# Patient Record
Sex: MALE | Race: WHITE | Employment: OTHER | URBAN - METROPOLITAN AREA
[De-identification: names, ages, dates, MRNs, and addresses within clinical notes are randomized per-mention and may not be internally consistent; named-entity substitution may affect disease eponyms.]

---

## 2017-04-06 DIAGNOSIS — I10 HYPERTENSION, BENIGN: ICD-10-CM

## 2017-04-06 DIAGNOSIS — E78.2 MIXED HYPERLIPIDEMIA: ICD-10-CM

## 2017-04-06 RX ORDER — FOSINOPRIL SODIUM 40 MG/1
40 TABLET ORAL DAILY
Qty: 90 TAB | Refills: 0 | Status: SHIPPED | OUTPATIENT
Start: 2017-04-06 | End: 2017-07-07 | Stop reason: SDUPTHER

## 2017-04-06 RX ORDER — VERAPAMIL HYDROCHLORIDE 240 MG/1
240 TABLET, FILM COATED, EXTENDED RELEASE ORAL DAILY
Qty: 90 TAB | Refills: 0 | Status: SHIPPED | OUTPATIENT
Start: 2017-04-06 | End: 2017-07-07 | Stop reason: SDUPTHER

## 2017-04-06 RX ORDER — CLONIDINE HYDROCHLORIDE 0.1 MG/1
0.1 TABLET ORAL 2 TIMES DAILY
Qty: 180 TAB | Refills: 0 | Status: SHIPPED | OUTPATIENT
Start: 2017-04-06 | End: 2017-07-07 | Stop reason: SDUPTHER

## 2017-07-07 DIAGNOSIS — I10 HYPERTENSION, BENIGN: ICD-10-CM

## 2017-07-07 DIAGNOSIS — E78.2 MIXED HYPERLIPIDEMIA: ICD-10-CM

## 2017-07-28 RX ORDER — CLONIDINE HYDROCHLORIDE 0.1 MG/1
TABLET ORAL
Qty: 180 TAB | Refills: 0 | Status: SHIPPED | OUTPATIENT
Start: 2017-07-28 | End: 2017-08-01 | Stop reason: SDUPTHER

## 2017-07-28 RX ORDER — VERAPAMIL HYDROCHLORIDE 240 MG/1
TABLET, FILM COATED, EXTENDED RELEASE ORAL
Qty: 90 TAB | Refills: 0 | Status: SHIPPED | OUTPATIENT
Start: 2017-07-28 | End: 2017-08-01 | Stop reason: SDUPTHER

## 2017-07-28 RX ORDER — FOSINOPRIL SODIUM 40 MG/1
TABLET ORAL
Qty: 90 TAB | Refills: 0 | Status: SHIPPED | OUTPATIENT
Start: 2017-07-28 | End: 2017-08-01 | Stop reason: SDUPTHER

## 2017-07-28 NOTE — TELEPHONE ENCOUNTER
Requested Prescriptions     Signed Prescriptions Disp Refills    fosinopril (MONOPRIL) 40 mg tablet 90 Tab 0     Sig: TAKE 1 TABLET DAILY (PLEASE SCHEDULE A FOLLOW UP VISIT FOR FURTHER REFILLS)     Authorizing Provider: Boaz Coleman     Ordering User: Radha Verma    verapamil ER (CALAN-SR) 240 mg CR tablet 90 Tab 0     Sig: TAKE 1 TABLET DAILY     Authorizing Provider: Boaz Coleman     Ordering User: Radha Verma    cloNIDine HCl (CATAPRES) 0.1 mg tablet 180 Tab 0     Sig: TAKE 1 TABLET TWICE A DAY (PLEASE SCHEDULE A FOLLOW UP VISIT)     Authorizing Provider: Boaz Coleman     Ordering User: Radha Verma    Per Dr. Lan Law verbal orders

## 2017-07-31 ENCOUNTER — TELEPHONE (OUTPATIENT)
Dept: CARDIOLOGY CLINIC | Age: 62
End: 2017-07-31

## 2017-07-31 NOTE — TELEPHONE ENCOUNTER
Patient states that he contacted [facility] and they have received the three prescriptions that were sent in on Friday. Can be reached at [phone]. Thanks!

## 2017-08-01 DIAGNOSIS — E78.2 MIXED HYPERLIPIDEMIA: ICD-10-CM

## 2017-08-01 DIAGNOSIS — I10 HYPERTENSION, BENIGN: ICD-10-CM

## 2017-08-01 RX ORDER — VERAPAMIL HYDROCHLORIDE 240 MG/1
240 TABLET, FILM COATED, EXTENDED RELEASE ORAL DAILY
Qty: 90 TAB | Refills: 0 | Status: SHIPPED | OUTPATIENT
Start: 2017-08-01 | End: 2017-09-15 | Stop reason: SDUPTHER

## 2017-08-01 RX ORDER — CLONIDINE HYDROCHLORIDE 0.1 MG/1
0.1 TABLET ORAL 2 TIMES DAILY
Qty: 180 TAB | Refills: 0 | Status: SHIPPED | OUTPATIENT
Start: 2017-08-01 | End: 2017-09-15 | Stop reason: SDUPTHER

## 2017-08-01 RX ORDER — FOSINOPRIL SODIUM 40 MG/1
40 TABLET ORAL DAILY
Qty: 90 TAB | Refills: 0 | Status: SHIPPED | OUTPATIENT
Start: 2017-08-01 | End: 2017-09-15 | Stop reason: SDUPTHER

## 2017-08-01 NOTE — TELEPHONE ENCOUNTER
Requested Prescriptions     Signed Prescriptions Disp Refills    fosinopril (MONOPRIL) 40 mg tablet 90 Tab 0     Sig: Take 1 Tab by mouth daily. Authorizing Provider: Chloe Peguero     Ordering User: Tabitha Johns    verapamil ER (CALAN-SR) 240 mg CR tablet 90 Tab 0     Sig: Take 1 Tab by mouth daily. Authorizing Provider: Chloe Peguero     Ordering User: Rod Blake cloNIDine HCl (CATAPRES) 0.1 mg tablet 180 Tab 0     Sig: Take 1 Tab by mouth two (2) times a day.      Authorizing Provider: Chloe Peguero     Ordering User: Tabitha Johns   Per Dr. Andrea Ayoub verbal orders     Future Appointments  Date Time Provider Eugene Barger   9/7/2017 8:40 AM Irena Pickett  [address]

## 2017-09-15 ENCOUNTER — OFFICE VISIT (OUTPATIENT)
Dept: CARDIOLOGY CLINIC | Age: 62
End: 2017-09-15

## 2017-09-15 VITALS
HEIGHT: 70 IN | BODY MASS INDEX: 42.46 KG/M2 | WEIGHT: 296.6 LBS | DIASTOLIC BLOOD PRESSURE: 90 MMHG | SYSTOLIC BLOOD PRESSURE: 130 MMHG | HEART RATE: 60 BPM

## 2017-09-15 DIAGNOSIS — E78.2 MIXED HYPERLIPIDEMIA: ICD-10-CM

## 2017-09-15 DIAGNOSIS — I10 HYPERTENSION, BENIGN: Primary | ICD-10-CM

## 2017-09-15 RX ORDER — VERAPAMIL HYDROCHLORIDE 240 MG/1
240 TABLET, FILM COATED, EXTENDED RELEASE ORAL DAILY
Qty: 90 TAB | Refills: 2 | Status: SHIPPED | OUTPATIENT
Start: 2017-09-15 | End: 2018-09-09 | Stop reason: SDUPTHER

## 2017-09-15 RX ORDER — CLONIDINE HYDROCHLORIDE 0.1 MG/1
0.1 TABLET ORAL 2 TIMES DAILY
Qty: 180 TAB | Refills: 2 | Status: SHIPPED | OUTPATIENT
Start: 2017-09-15 | End: 2018-09-09 | Stop reason: SDUPTHER

## 2017-09-15 RX ORDER — FOSINOPRIL SODIUM 40 MG/1
40 TABLET ORAL DAILY
Qty: 90 TAB | Refills: 2 | Status: SHIPPED | OUTPATIENT
Start: 2017-09-15 | End: 2018-09-09 | Stop reason: SDUPTHER

## 2017-09-15 NOTE — MR AVS SNAPSHOT
Visit Information Date & Time Provider Department Dept. Phone Encounter #  
 9/15/2017  8:20 AM Renu Bartlett MD [facility] Garcia [phone] [id number] Follow-up Instructions Return in about 6 months (around 3/15/2018). Your Appointments 9/14/2018  9:00 AM  
ESTABLISHED PATIENT with Renu Bartlett MD  
[facility] ([facility]) Appt Note: one year follow up  
 [address] [facility] [address]  
[address] [place] [address] Upcoming Health Maintenance Date Due Hepatitis C Screening 1955 DTaP/Tdap/Td series (1 - Tdap) 8/7/1978 FOBT Q 1 YEAR AGE 50-75 8/7/2007 ZOSTER VACCINE AGE 60> 6/7/2017 INFLUENZA AGE 9 TO ADULT 8/1/2017 Allergies as of 9/15/2017  Review Complete On: 9/15/2017 By: Renu Bartlett MD  
  
 Severity Noted Reaction Type Reactions Ancef [Cefazolin] High 12/15/2010   Side Effect Hives, Itching Minocycline High 02/24/2011   Side Effect Anaphylaxis Tetracycline High 12/15/2010   Side Effect Anaphylaxis Crestor [Rosuvastatin]  04/11/2016    Other (comments) Joint pain Erythromycin  04/11/2016    Itching  
 hives Lipitor [Atorvastatin]  04/11/2016    Other (comments) Joint pain Current Immunizations  Never Reviewed No immunizations on file. Not reviewed this visit You Were Diagnosed With   
  
 Codes Comments Hypertension, benign    -  Primary ICD-10-CM: I10 
ICD-9-CM: 401.1 Mixed hyperlipidemia     ICD-10-CM: E78.2 ICD-9-CM: 272.2 Vitals BP Pulse Height(growth percentile) Weight(growth percentile) BMI Smoking Status 130/90 60 5' 10\" (1.778 m) 296 lb 9.6 oz (134.5 kg) 42.56 kg/m2 Former Smoker Vitals History BMI and BSA Data Body Mass Index Body Surface Area 42.56 kg/m 2 2.58 m 2 [phone] Your Updated Medication List  
  
   
This list is accurate as of: 9/15/17  9:43 AM.  Always use your most recent med list.  
  
  
  
  
 cloNIDine HCl 0.1 mg tablet Commonly known as:  CATAPRES Take 1 Tab by mouth two (2) times a day. fosinopril 40 mg tablet Commonly known as:  MONOPRIL Take 1 Tab by mouth daily. naproxen 500 mg tablet Commonly known as:  NAPROSYN Take 500 mg by mouth two (2) times daily (with meals). PRILOSEC PO Take 20 mg by mouth daily. OTC  
  
 verapamil  mg CR tablet Commonly known as:  CALAN-SR Take 1 Tab by mouth daily. Prescriptions Sent to Pharmacy Refills  
 fosinopril (MONOPRIL) 40 mg tablet 2 Sig: Take 1 Tab by mouth daily. Class: Normal  
 Pharmacy: [address], [address] Ph #: [phone] Route: Oral  
 verapamil ER (CALAN-SR) 240 mg CR tablet 2 Sig: Take 1 Tab by mouth daily. Class: Normal  
 Pharmacy: [address], [address] Ph #: [phone] Route: Oral  
 cloNIDine HCl (CATAPRES) 0.1 mg tablet 2 Sig: Take 1 Tab by mouth two (2) times a day. Class: Normal  
 Pharmacy: [address], [address] Ph #: [phone] Route: Oral  
  
Follow-up Instructions Return in about 6 months (around 3/15/2018). Introducing [facility] & HEALTH SERVICES! Leelee Cowan introduces [employer] patient portal. Now you can access parts of your medical record, email your doctor's office, and request medication refills online. 1. In your internet browser, go to [email or web address] 2. Click on the First Time User? Click Here link in the Sign In box. You will see the New Member Sign Up page. 3. Enter your [employer] Access Code exactly as it appears below.  You will not need to use this code after youve completed the sign-up process. If you do not sign up before the expiration date, you must request a new code. · [employer] Access Code: [email or web address] Expires: 12/14/2017  9:08 AM 
 
4. Enter the last four digits of your Social Security Number (xxxx) and Date of Birth (mm/dd/yyyy) as indicated and click Submit. You will be taken to the next sign-up page. 5. Create a QuantiSense ID. This will be your Qu[employer] login ID and cannot be changed, so think of one that is secure and easy to remember. 6. Create a [employer] password. You can change your password at any time. 7. Enter your Password Reset Question and Answer. This can be used at a later time if you forget your password. 8. Enter your e-mail address. You will receive e-mail notification when new information is available in [address]. 9. Click Sign Up. You can now view and download portions of your medical record. 10. Click the Download Summary menu link to download a portable copy of your medical information. If you have questions, please visit the Frequently Asked Questions section of the [employer] website. Remember, [employer] is NOT to be used for urgent needs. For medical emergencies, dial 911. Now available from your iPhone and Android! Please provide this summary of care documentation to your next provider. Your primary care clinician is listed as Mukul Dc. If you have any questions after today's visit, please call [phone].

## 2017-09-15 NOTE — PROGRESS NOTES
HISTORY OF PRESENT ILLNESS  Day Casillas is a 63 y.o. male     SUMMARY:   Problem List  Date Reviewed: 9/15/2017          Codes Class Noted    Hypertension, benign ICD-10-CM: I10  ICD-9-CM: 401.1  Unknown    Overview Signed 2/23/2011  3:47 PM by Maryclare Duverney, MD     Date of previous stress test was 9/25/06. The type of test was a treadmill - Standard Pedro Protocol with myocardial perfusion imaging. Exercise tolerance was good. Cardiac stress testing was negative for chest pain and myocardial ischemia. Stress echocardiogram images showed normal resting LV wall motion and no evidence of ischemia. Mixed hyperlipidemia ICD-10-CM: E78.2  ICD-9-CM: 272.2  Unknown              Current Outpatient Prescriptions on File Prior to Visit   Medication Sig    fosinopril (MONOPRIL) 40 mg tablet Take 1 Tab by mouth daily.  verapamil ER (CALAN-SR) 240 mg CR tablet Take 1 Tab by mouth daily.  cloNIDine HCl (CATAPRES) 0.1 mg tablet Take 1 Tab by mouth two (2) times a day.  naproxen (NAPROSYN) 500 mg tablet Take 500 mg by mouth two (2) times daily (with meals).  OMEPRAZOLE (PRILOSEC PO) Take 20 mg by mouth daily. OTC     No current facility-administered medications on file prior to visit. CARDIOLOGY STUDIES TO DATE:  9/25/06. The type of test was a treadmill - Standard Pedro Protocol with myocardial perfusion imaging. Exercise tolerance was good. Cardiac stress testing was negative for chest pain and myocardial ischemia. Stress echocardiogram images showed normal resting LV wall motion and no evidence of ischemia      Chief Complaint   Patient presents with    Hypertension     HPI :  Mr. Kay Muller is doing okay with no particular cardiac complaints. Because of his orthopedic issues he is really not exercising much, and he has gained about 16 pounds since we last met. Blood pressure is generally doing well, though his diastolic is borderline today. No problems with his medications. CARDIAC ROS:   negative for chest pain, dyspnea, palpitations, syncope, orthopnea, paroxysmal nocturnal dyspnea, exertional chest pressure/discomfort, claudication, lower extremity edema    Family History   Problem Relation Age of Onset    Heart Disease Brother      cabg age 59       Past Medical History:   Diagnosis Date    Brock esophagus     Hypertension, benign     Mixed hyperlipidemia     Sleep apnea        GENERAL ROS:  A comprehensive review of systems was negative except for that written in the HPI. Visit Vitals    /90    Pulse 60    Ht 5' 10\" (1.778 m)    Wt 296 lb 9.6 oz (134.5 kg)    BMI 42.56 kg/m2       Wt Readings from Last 3 Encounters:   09/15/17 296 lb 9.6 oz (134.5 kg)   04/11/16 281 lb (127.5 kg)   06/16/14 247 lb (112 kg)            BP Readings from Last 3 Encounters:   09/15/17 130/90   04/11/16 150/84   06/16/14 140/80       PHYSICAL EXAM  General appearance: alert, cooperative, no distress, appears stated age  Neck: supple, symmetrical, trachea midline, no adenopathy, no carotid bruit and no JVD  Lungs: clear to auscultation bilaterally  Heart: regular rate and rhythm, S1, S2 normal, no murmur, click, rub or gallop  Extremities: extremities normal, atraumatic, no cyanosis or edema    Lab Results   Component Value Date/Time    Cholesterol, total 208 09/06/2013 10:49 AM    Cholesterol, total 191 05/13/2013 01:17 PM    HDL Cholesterol 38 09/06/2013 10:49 AM    HDL Cholesterol 40 05/13/2013 01:17 PM    LDL, calculated 140 09/06/2013 10:49 AM    LDL, calculated 133 05/13/2013 01:17 PM    Triglyceride 151 09/06/2013 10:49 AM    Triglyceride 91 05/13/2013 01:17 PM     ASSESSMENT  Mr. Dimitris Judd is stable, asymptomatic and well-compensated on a good medical regimen and needs no cardiac testing at this time. We will try to track down his most recent lipid profile. It is absolutely essential that he figure out a way to get his weight under control.       current treatment plan is effective, no change in therapy  lab results and schedule of future lab studies reviewed with patient    Encounter Diagnoses   Name Primary?  Hypertension, benign Yes    Mixed hyperlipidemia      No orders of the defined types were placed in this encounter. Follow-up Disposition:  Return in about 6 months (around 3/15/2018).     Jey Cisneros MD  9/15/2017

## 2018-09-09 DIAGNOSIS — E78.2 MIXED HYPERLIPIDEMIA: ICD-10-CM

## 2018-09-09 DIAGNOSIS — I10 HYPERTENSION, BENIGN: ICD-10-CM

## 2018-09-10 RX ORDER — CLONIDINE HYDROCHLORIDE 0.1 MG/1
TABLET ORAL
Qty: 60 TAB | Refills: 0 | Status: SHIPPED | OUTPATIENT
Start: 2018-09-10 | End: 2018-10-08 | Stop reason: SDUPTHER

## 2018-09-10 RX ORDER — VERAPAMIL HYDROCHLORIDE 240 MG/1
TABLET, FILM COATED, EXTENDED RELEASE ORAL
Qty: 30 TAB | Refills: 0 | Status: SHIPPED | OUTPATIENT
Start: 2018-09-10 | End: 2018-10-08 | Stop reason: SDUPTHER

## 2018-09-10 RX ORDER — FOSINOPRIL SODIUM 40 MG/1
TABLET ORAL
Qty: 30 TAB | Refills: 0 | Status: SHIPPED | OUTPATIENT
Start: 2018-09-10 | End: 2018-10-08 | Stop reason: SDUPTHER

## 2018-09-10 NOTE — TELEPHONE ENCOUNTER
Requested Prescriptions     Signed Prescriptions Disp Refills    fosinopril (MONOPRIL) 40 mg tablet 30 Tab 0     Sig: TAKE 1 TABLET DAILY     Authorizing Provider: Meron Sandoval III     Ordering User: Davida Olguin    verapamil ER (CALAN-SR) 240 mg CR tablet 30 Tab 0     Sig: TAKE 1 TABLET DAILY     Authorizing Provider: Trae Blanco     Ordering User: Davida Olguin    cloNIDine HCl (CATAPRES) 0.1 mg tablet 60 Tab 0     Sig: TAKE 1 TABLET TWICE A DAY     Authorizing Provider: Trae Blanco     Ordering User: Davida Olguin       Per Dr. Reanna Zavala  Date Time Provider [facility]   10/8/2018 9:40 AM Yoly Donnelly  [address]

## 2018-10-08 ENCOUNTER — OFFICE VISIT (OUTPATIENT)
Dept: CARDIOLOGY CLINIC | Age: 63
End: 2018-10-08

## 2018-10-08 VITALS
HEART RATE: 56 BPM | SYSTOLIC BLOOD PRESSURE: 110 MMHG | OXYGEN SATURATION: 97 % | HEIGHT: 70 IN | RESPIRATION RATE: 18 BRPM | DIASTOLIC BLOOD PRESSURE: 70 MMHG | WEIGHT: 294 LBS | BODY MASS INDEX: 42.09 KG/M2

## 2018-10-08 DIAGNOSIS — Z78.9 STATIN INTOLERANCE: ICD-10-CM

## 2018-10-08 DIAGNOSIS — E78.2 MIXED HYPERLIPIDEMIA: ICD-10-CM

## 2018-10-08 DIAGNOSIS — I10 HYPERTENSION, BENIGN: Primary | ICD-10-CM

## 2018-10-08 DIAGNOSIS — E66.01 OBESITY, MORBID (HCC): ICD-10-CM

## 2018-10-08 RX ORDER — CLONIDINE HYDROCHLORIDE 0.2 MG/1
0.2 TABLET ORAL 2 TIMES DAILY
Qty: 180 TAB | Refills: 3 | Status: SHIPPED | OUTPATIENT
Start: 2018-10-08 | End: 2019-12-17 | Stop reason: SDUPTHER

## 2018-10-08 RX ORDER — FOSINOPRIL SODIUM 40 MG/1
TABLET ORAL
Qty: 90 TAB | Refills: 3 | Status: SHIPPED | OUTPATIENT
Start: 2018-10-08 | End: 2020-01-10 | Stop reason: SDUPTHER

## 2018-10-08 RX ORDER — VERAPAMIL HYDROCHLORIDE 240 MG/1
TABLET, FILM COATED, EXTENDED RELEASE ORAL
Qty: 90 TAB | Refills: 3 | Status: SHIPPED | OUTPATIENT
Start: 2018-10-08 | End: 2019-07-22 | Stop reason: SDUPTHER

## 2018-10-08 NOTE — MR AVS SNAPSHOT
[address] [facility] [address] [place] [address] 
[phone] Patient: Yris Louise MRN: [id number] [facility]:[birth year] Visit Information Date & Time Provider Department Dept. Phone Encounter #  
 10/8/2018  9:40 AM Asad Saucedo MD [facility] [phone] [id number] Follow-up Instructions Return in about 1 year (around 10/8/2019). Your Appointments 10/11/2019  8:20 AM  
ESTABLISHED PATIENT with Asad Saucedo MD  
[facility] ([address]) Appt Note: 1 yr f/u per Dr. Antonette Cosby [address]  [address] [place] [address]  
[address] [address] Upcoming Health Maintenance Date Due Hepatitis C Screening 1955 DTaP/Tdap/Td series (1 - Tdap) 8/7/1978 Shingrix Vaccine Age 50> (1 of 2) 8/7/2007 FOBT Q 1 YEAR AGE 50-75 8/7/2007 Influenza Age 5 to Adult 8/1/2018 Allergies as of 10/8/2018  Review Complete On: 10/8/2018 By: Asad Saucedo MD  
  
 Severity Noted Reaction Type Reactions Ancef [Cefazolin] High 12/15/2010   Side Effect Hives, Itching Minocycline High 02/24/2011   Side Effect Anaphylaxis Tetracycline High 12/15/2010   Side Effect Anaphylaxis Crestor [Rosuvastatin]  04/11/2016    Other (comments) Joint pain Erythromycin  04/11/2016    Itching  
 hives Lipitor [Atorvastatin]  04/11/2016    Other (comments) Joint pain Current Immunizations  Never Reviewed No immunizations on file. Not reviewed this visit You Were Diagnosed With   
  
 Codes Comments Hypertension, benign    -  Primary ICD-10-CM: I10 
ICD-9-CM: 401.1 Mixed hyperlipidemia     ICD-10-CM: E78.2 ICD-9-CM: 272.2 Obesity, morbid ([facility] [address]     ICD-10-CM: E66.01 
ICD-9-CM: 278.01 Statin intolerance     ICD-10-CM: Z78.9 ICD-9-CM: 995.27 Vitals BP Pulse Resp Height(growth percentile) Weight(growth percentile) SpO2  
 110/70 (BP 1 Location: Right arm, BP Patient Position: Sitting) (!) 56 18 5' 10\" (1.778 m) 294 lb (133.4 kg) 97% BMI Smoking Status 42.18 kg/m2 Former Smoker Vitals History BMI and BSA Data Body Mass Index Body Surface Area  
 42.18 kg/m 2 2.57 m 2 Preferred Pharmacy Pharmacy Name Phone [facility] [phone] Your Updated Medication List  
  
   
This list is accurate as of 10/8/18  9:53 AM.  Always use your most recent med list.  
  
  
  
  
 cloNIDine HCl 0.2 mg tablet Commonly known as:  CATAPRES Take 1 Tab by mouth two (2) times a day. fosinopril 40 mg tablet Commonly known as:  MONOPRIL  
TAKE 1 TABLET DAILY  
  
 naproxen 500 mg tablet Commonly known as:  NAPROSYN Take 500 mg by mouth two (2) times daily (with meals). PRILOSEC PO Take 20 mg by mouth daily. OTC  
  
 verapamil  mg CR tablet Commonly known as:  CALAN-SR  
TAKE 1 TABLET DAILY Prescriptions Sent to Pharmacy Refills  
 cloNIDine HCl (CATAPRES) 0.2 mg tablet 3 Sig: Take 1 Tab by mouth two (2) times a day. Class: Normal  
 Pharmacy: [address] [place] [address] Ph #: [phone] Route: Oral  
 fosinopril (MONOPRIL) 40 mg tablet 3 Sig: TAKE 1 TABLET DAILY Class: Normal  
 Pharmacy: [address] Ph #: [phone]  
 verapamil ER (CALAN-SR) 240 mg CR tablet 3 Sig: TAKE 1 TABLET DAILY Class: Normal  
 Pharmacy: [address] [place] [address] Ph #: [phone] Follow-up Instructions Return in about 1 year (around 10/8/2019). Introducing hospitals & HEALTH SERVICES!    
 Alexa Hunter introduces [employer] patient portal. Now you can access parts of your medical record, email your doctor's office, and request medication refills online. 1. In your internet browser, go to [email or web address] 2. Click on the First Time User? Click Here link in the Sign In box. You will see the New Member Sign Up page. 3. Enter your [employer] Access Code exactly as it appears below. You will not need to use this code after youve completed the sign-up process. If you do not sign up before the expiration date, you must request a new code. · [employer] Access Code: [email or web address] Expires: 1/6/2019  9:19 AM 
 
4. Enter the last four digits of your Social Security Number (xxxx) and Date of Birth (mm/dd/yyyy) as indicated and click Submit. You will be taken to the next sign-up page. 5. Create a [employer] ID. This will be your [employer] login ID and cannot be changed, so think of one that is secure and easy to remember. 6. Create a [employer] password. You can change your password at any time. 7. Enter your Password Reset Question and Answer. This can be used at a later time if you forget your password. 8. Enter your e-mail address. You will receive e-mail notification when new information is available in [address] 9. Click Sign Up. You can now view and download portions of your medical record. 10. Click the Download Summary menu link to download a portable copy of your medical information. If you have questions, please visit the Frequently Asked Questions section of the [employer] website. Remember, [employer] is NOT to be used for urgent needs. For medical emergencies, dial 911. Now available from your iPhone and Android! Please provide this summary of care documentation to your next provider. Your primary care clinician is listed as Adilene Diego. If you have any questions after today's visit, please call [phone].

## 2018-10-08 NOTE — PROGRESS NOTES
HISTORY OF PRESENT ILLNESS  Pia Candelario is a 66 y.o. male     SUMMARY:   Problem List  Date Reviewed: 10/8/2018          Codes Class Noted    Obesity, morbid ([facility] [address] ICD-10-CM: E66.01  ICD-9-CM: 278.01  10/8/2018        Hypertension, benign ICD-10-CM: I10  ICD-9-CM: 401.1  Unknown    Overview Signed 2/23/2011  3:47 PM by Agustin Narayanan MD     Date of previous stress test was 9/25/06. The type of test was a treadmill - Standard Pedro Protocol with myocardial perfusion imaging. Exercise tolerance was good. Cardiac stress testing was negative for chest pain and myocardial ischemia. Stress echocardiogram images showed normal resting LV wall motion and no evidence of ischemia. Mixed hyperlipidemia ICD-10-CM: E78.2  ICD-9-CM: 272.2  Unknown              Current Outpatient Prescriptions on File Prior to Visit   Medication Sig    fosinopril (MONOPRIL) 40 mg tablet TAKE 1 TABLET DAILY    verapamil ER (CALAN-SR) 240 mg CR tablet TAKE 1 TABLET DAILY    cloNIDine HCl (CATAPRES) 0.1 mg tablet TAKE 1 TABLET TWICE A DAY    naproxen (NAPROSYN) 500 mg tablet Take 500 mg by mouth two (2) times daily (with meals).  OMEPRAZOLE (PRILOSEC PO) Take 20 mg by mouth daily. OTC     No current facility-administered medications on file prior to visit. CARDIOLOGY STUDIES TO DATE:  9/25/06. The type of test was a treadmill - Standard Pedro Protocol with myocardial perfusion imaging. Exercise tolerance was good. Cardiac stress testing was negative for chest pain and myocardial ischemia. Stress echocardiogram images showed normal resting LV wall motion and no evidence of ischemia         Chief Complaint   Patient presents with    Hypertension     HPI :  Mr. Argelia Velasco is feeling well. He has been traveling a lot this summer and went to [place] and then just got back from three weeks in the [facility]. As a consequence, he is now back to gaining weight.   His blood pressures have been elevated at home and he brought in a bunch of readings for us to review and he is not exercising. CARDIAC ROS:   negative for chest pain, dyspnea, palpitations, syncope, orthopnea, paroxysmal nocturnal dyspnea, exertional chest pressure/discomfort, claudication, lower extremity edema    Family History   Problem Relation Age of Onset    Heart Disease Brother      cabg age 59       Past Medical History:   Diagnosis Date    Brock esophagus     Hypertension, benign     Mixed hyperlipidemia     Sleep apnea        GENERAL ROS:  A comprehensive review of systems was negative except for that written in the HPI. Visit Vitals    /70 (BP 1 Location: Right arm, BP Patient Position: Sitting)    Pulse (!) 56    Resp 18    Ht 5' 10\" (1.778 m)    Wt 294 lb (133.4 kg)    SpO2 97%    BMI 42.18 kg/m2       Wt Readings from Last 3 Encounters:   10/08/18 294 lb (133.4 kg)   09/15/17 296 lb 9.6 oz (134.5 kg)   04/11/16 281 lb (127.5 kg)            BP Readings from Last 3 Encounters:   10/08/18 110/70   09/15/17 130/90   04/11/16 150/84       PHYSICAL EXAM  General appearance: alert, cooperative, no distress, appears stated age  Neurologic: Alert and oriented X 3  Neck: supple, symmetrical, trachea midline, no adenopathy, no carotid bruit and no JVD  Lungs: clear to auscultation bilaterally  Heart: regular rate and rhythm, S1, S2 normal, no murmur, click, rub or gallop  Extremities: extremities normal, atraumatic, no cyanosis or edema    Lab Results   Component Value Date/Time    Cholesterol, total 208 (H) 09/06/2013 10:49 AM    Cholesterol, total 191 05/13/2013 01:17 PM    HDL Cholesterol 38 (L) 09/06/2013 10:49 AM    HDL Cholesterol 40 05/13/2013 01:17 PM    LDL, calculated 140 (H) 09/06/2013 10:49 AM    LDL, calculated 133 (H) 05/13/2013 01:17 PM    Triglyceride 151 (H) 09/06/2013 10:49 AM    Triglyceride 91 05/13/2013 01:17 PM     ASSESSMENT  Mr. Marcos Washington is stable and asymptomatic at this point.   His blood pressure is not ideal.  I looked back in his chart and back in 2014 when he was 40 pounds lighter, his blood pressure was normal, so we talked about that. For now, we are going to increase his Clonidine to 0.2 mg twice a day, but I want him to really focus back on exercise and weight loss. current treatment plan is effective, no change in therapy  lab results and schedule of future lab studies reviewed with patient    Encounter Diagnoses   Name Primary?  Hypertension, benign Yes    Mixed hyperlipidemia     Obesity, morbid ([address]     Statin intolerance      No orders of the defined types were placed in this encounter. Follow-up Disposition:  Return in about 1 year (around 10/8/2019).     Giancarlo Isaac MD  10/8/2018

## 2019-01-16 ENCOUNTER — HOSPITAL ENCOUNTER (EMERGENCY)
Age: 64
Discharge: HOME | End: 2019-01-16
Attending: EMERGENCY MEDICINE
Payer: COMMERCIAL

## 2019-01-16 ENCOUNTER — APPOINTMENT (OUTPATIENT)
Dept: CT IMAGING | Age: 64
End: 2019-01-16
Attending: EMERGENCY MEDICINE
Payer: COMMERCIAL

## 2019-01-16 VITALS
BODY MASS INDEX: 42.23 KG/M2 | HEART RATE: 70 BPM | RESPIRATION RATE: 20 BRPM | TEMPERATURE: 98.1 F | SYSTOLIC BLOOD PRESSURE: 181 MMHG | HEIGHT: 70 IN | OXYGEN SATURATION: 96 % | WEIGHT: 295 LBS | DIASTOLIC BLOOD PRESSURE: 84 MMHG

## 2019-01-16 DIAGNOSIS — N20.1 RIGHT URETERAL STONE: Primary | ICD-10-CM

## 2019-01-16 LAB
ALBUMIN SERPL-MCNC: 3.1 G/DL (ref 3.5–5)
ALBUMIN SERPL-MCNC: 3.9 G/DL (ref 3.5–5)
ALBUMIN/GLOB SERPL: 0.8 {RATIO} (ref 1.1–2.2)
ALBUMIN/GLOB SERPL: 0.8 {RATIO} (ref 1.1–2.2)
ALP SERPL-CCNC: 78 U/L (ref 45–117)
ALP SERPL-CCNC: 95 U/L (ref 45–117)
ALT SERPL-CCNC: 25 U/L (ref 12–78)
ALT SERPL-CCNC: 36 U/L (ref 12–78)
ANION GAP SERPL CALC-SCNC: 10 MMOL/L (ref 5–15)
ANION GAP SERPL CALC-SCNC: 9 MMOL/L (ref 5–15)
APPEARANCE UR: CLEAR
AST SERPL-CCNC: 18 U/L (ref 15–37)
AST SERPL-CCNC: ABNORMAL U/L (ref 15–37)
BACTERIA URNS QL MICRO: NEGATIVE /HPF
BASOPHILS # BLD: 0.1 K/UL (ref 0–0.1)
BASOPHILS NFR BLD: 1 % (ref 0–1)
BILIRUB SERPL-MCNC: 0.3 MG/DL (ref 0.2–1)
BILIRUB SERPL-MCNC: 0.5 MG/DL (ref 0.2–1)
BILIRUB UR QL: NEGATIVE
BUN SERPL-MCNC: 13 MG/DL (ref 6–20)
BUN SERPL-MCNC: 13 MG/DL (ref 6–20)
BUN/CREAT SERPL: 10 (ref 12–20)
BUN/CREAT SERPL: 11 (ref 12–20)
CALCIUM SERPL-MCNC: 8.6 MG/DL (ref 8.5–10.1)
CALCIUM SERPL-MCNC: 9.3 MG/DL (ref 8.5–10.1)
CHLORIDE SERPL-SCNC: 102 MMOL/L (ref 97–108)
CHLORIDE SERPL-SCNC: 107 MMOL/L (ref 97–108)
CO2 SERPL-SCNC: 29 MMOL/L (ref 21–32)
CO2 SERPL-SCNC: 29 MMOL/L (ref 21–32)
COLOR UR: NORMAL
COMMENT, HOLDF: NORMAL
CREAT SERPL-MCNC: 1.17 MG/DL (ref 0.7–1.3)
CREAT SERPL-MCNC: 1.24 MG/DL (ref 0.7–1.3)
DIFFERENTIAL METHOD BLD: ABNORMAL
EOSINOPHIL # BLD: 0.2 K/UL (ref 0–0.4)
EOSINOPHIL NFR BLD: 2 % (ref 0–7)
EPITH CASTS URNS QL MICRO: NORMAL /LPF
ERYTHROCYTE [DISTWIDTH] IN BLOOD BY AUTOMATED COUNT: 14.4 % (ref 11.5–14.5)
GLOBULIN SER CALC-MCNC: 3.8 G/DL (ref 2–4)
GLOBULIN SER CALC-MCNC: 5 G/DL (ref 2–4)
GLUCOSE SERPL-MCNC: 125 MG/DL (ref 65–100)
GLUCOSE SERPL-MCNC: 125 MG/DL (ref 65–100)
GLUCOSE UR STRIP.AUTO-MCNC: NEGATIVE MG/DL
HCT VFR BLD AUTO: 45.2 % (ref 36.6–50.3)
HGB BLD-MCNC: 14.6 G/DL (ref 12.1–17)
HGB UR QL STRIP: NEGATIVE
HYALINE CASTS URNS QL MICRO: NORMAL /LPF (ref 0–5)
IMM GRANULOCYTES # BLD AUTO: 0.2 K/UL (ref 0–0.04)
IMM GRANULOCYTES NFR BLD AUTO: 1 % (ref 0–0.5)
KETONES UR QL STRIP.AUTO: NEGATIVE MG/DL
LEUKOCYTE ESTERASE UR QL STRIP.AUTO: NEGATIVE
LYMPHOCYTES # BLD: 1.7 K/UL (ref 0.8–3.5)
LYMPHOCYTES NFR BLD: 13 % (ref 12–49)
MCH RBC QN AUTO: 27.5 PG (ref 26–34)
MCHC RBC AUTO-ENTMCNC: 32.3 G/DL (ref 30–36.5)
MCV RBC AUTO: 85.1 FL (ref 80–99)
MONOCYTES # BLD: 0.8 K/UL (ref 0–1)
MONOCYTES NFR BLD: 6 % (ref 5–13)
NEUTS SEG # BLD: 10.6 K/UL (ref 1.8–8)
NEUTS SEG NFR BLD: 78 % (ref 32–75)
NITRITE UR QL STRIP.AUTO: NEGATIVE
NRBC # BLD: 0 K/UL (ref 0–0.01)
NRBC BLD-RTO: 0 PER 100 WBC
PH UR STRIP: 7.5 [PH] (ref 5–8)
PLATELET # BLD AUTO: 366 K/UL (ref 150–400)
PMV BLD AUTO: 10.3 FL (ref 8.9–12.9)
POTASSIUM SERPL-SCNC: 4 MMOL/L (ref 3.5–5.1)
POTASSIUM SERPL-SCNC: ABNORMAL MMOL/L (ref 3.5–5.1)
PROT SERPL-MCNC: 6.9 G/DL (ref 6.4–8.2)
PROT SERPL-MCNC: 8.9 G/DL (ref 6.4–8.2)
PROT UR STRIP-MCNC: NEGATIVE MG/DL
RBC # BLD AUTO: 5.31 M/UL (ref 4.1–5.7)
RBC #/AREA URNS HPF: NORMAL /HPF (ref 0–5)
SAMPLES BEING HELD,HOLD: NORMAL
SODIUM SERPL-SCNC: 141 MMOL/L (ref 136–145)
SODIUM SERPL-SCNC: 145 MMOL/L (ref 136–145)
SP GR UR REFRACTOMETRY: 1.01 (ref 1–1.03)
UR CULT HOLD, URHOLD: NORMAL
UROBILINOGEN UR QL STRIP.AUTO: 0.2 EU/DL (ref 0.2–1)
WBC # BLD AUTO: 13.5 K/UL (ref 4.1–11.1)
WBC URNS QL MICRO: NORMAL /HPF (ref 0–4)

## 2019-01-16 PROCEDURE — 96375 TX/PRO/DX INJ NEW DRUG ADDON: CPT

## 2019-01-16 PROCEDURE — 99284 EMERGENCY DEPT VISIT MOD MDM: CPT

## 2019-01-16 PROCEDURE — 80053 COMPREHEN METABOLIC PANEL: CPT

## 2019-01-16 PROCEDURE — 96374 THER/PROPH/DIAG INJ IV PUSH: CPT

## 2019-01-16 PROCEDURE — 74176 CT ABD & PELVIS W/O CONTRAST: CPT

## 2019-01-16 PROCEDURE — 96361 HYDRATE IV INFUSION ADD-ON: CPT

## 2019-01-16 PROCEDURE — 85025 COMPLETE CBC W/AUTO DIFF WBC: CPT

## 2019-01-16 PROCEDURE — 36415 COLL VENOUS BLD VENIPUNCTURE: CPT

## 2019-01-16 PROCEDURE — 74011250636 HC RX REV CODE- 250/636: Performed by: EMERGENCY MEDICINE

## 2019-01-16 PROCEDURE — 81001 URINALYSIS AUTO W/SCOPE: CPT

## 2019-01-16 RX ORDER — ONDANSETRON 2 MG/ML
4 INJECTION INTRAMUSCULAR; INTRAVENOUS
Status: COMPLETED | OUTPATIENT
Start: 2019-01-16 | End: 2019-01-16

## 2019-01-16 RX ORDER — KETOROLAC TROMETHAMINE 30 MG/ML
30 INJECTION, SOLUTION INTRAMUSCULAR; INTRAVENOUS
Status: COMPLETED | OUTPATIENT
Start: 2019-01-16 | End: 2019-01-16

## 2019-01-16 RX ORDER — OXYCODONE AND ACETAMINOPHEN 5; 325 MG/1; MG/1
1 TABLET ORAL
Qty: 10 TAB | Refills: 0 | Status: SHIPPED | OUTPATIENT
Start: 2019-01-16 | End: 2020-01-10

## 2019-01-16 RX ORDER — TAMSULOSIN HYDROCHLORIDE 0.4 MG/1
0.4 CAPSULE ORAL
Qty: 7 CAP | Refills: 0 | Status: SHIPPED | OUTPATIENT
Start: 2019-01-16 | End: 2020-01-10

## 2019-01-16 RX ORDER — ONDANSETRON 4 MG/1
4 TABLET, ORALLY DISINTEGRATING ORAL
Qty: 12 TAB | Refills: 0 | Status: SHIPPED | OUTPATIENT
Start: 2019-01-16 | End: 2020-01-10

## 2019-01-16 RX ADMIN — SODIUM CHLORIDE 1000 ML: 900 INJECTION, SOLUTION INTRAVENOUS at 22:10

## 2019-01-16 RX ADMIN — ONDANSETRON 4 MG: 2 INJECTION INTRAMUSCULAR; INTRAVENOUS at 22:09

## 2019-01-16 RX ADMIN — KETOROLAC TROMETHAMINE 30 MG: 30 INJECTION, SOLUTION INTRAMUSCULAR at 22:09

## 2019-01-17 NOTE — DISCHARGE INSTRUCTIONS
Patient Education        Kidney Stone: Care Instructions  Your Care Instructions    Kidney stones are formed when salts, minerals, and other substances normally found in the urine clump together. They can be as small as grains of sand or, rarely, as large as golf balls. While the stone is traveling through the ureter, which is the tube that carries urine from the kidney to the bladder, you will probably feel pain. The pain may be mild or very severe. You may also have some blood in your urine. As soon as the stone reaches the bladder, any intense pain should go away. If a stone is too large to pass on its own, you may need a medical procedure to help you pass the stone. The doctor has checked you carefully, but problems can develop later. If you notice any problems or new symptoms, get medical treatment right away. Follow-up care is a key part of your treatment and safety. Be sure to make and go to all appointments, and call your doctor if you are having problems. It's also a good idea to know your test results and keep a list of the medicines you take. How can you care for yourself at home? · Drink plenty of fluids, enough so that your urine is light yellow or clear like water. If you have kidney, heart, or liver disease and have to limit fluids, talk with your doctor before you increase the amount of fluids you drink. · Take pain medicines exactly as directed. Call your doctor if you think you are having a problem with your medicine. ? If the doctor gave you a prescription medicine for pain, take it as prescribed. ? If you are not taking a prescription pain medicine, ask your doctor if you can take an over-the-counter medicine. Read and follow all instructions on the label. · Your doctor may ask you to strain your urine so that you can collect your kidney stone when it passes. You can use a kitchen strainer or a tea strainer to catch the stone.  Store it in a plastic bag until you see your doctor again.  Preventing future kidney stones  Some changes in your diet may help prevent kidney stones. Depending on the cause of your stones, your doctor may recommend that you:  · Drink plenty of fluids, enough so that your urine is light yellow or clear like water. If you have kidney, heart, or liver disease and have to limit fluids, talk with your doctor before you increase the amount of fluids you drink. · Limit coffee, tea, and alcohol. Also avoid grapefruit juice. · Do not take more than the recommended daily dose of vitamins C and D.  · Avoid antacids such as Gaviscon, Maalox, Mylanta, or Tums. · Limit the amount of salt (sodium) in your diet. · Eat a balanced diet that is not too high in protein. · Limit foods that are high in a substance called oxalate, which can cause kidney stones. These foods include dark green vegetables, rhubarb, chocolate, wheat bran, nuts, cranberries, and beans. When should you call for help? Call your doctor now or seek immediate medical care if:    · You cannot keep down fluids.     · Your pain gets worse.     · You have a fever or chills.     · You have new or worse pain in your back just below your rib cage (the flank area).     · You have new or more blood in your urine.    Watch closely for changes in your health, and be sure to contact your doctor if:    · You do not get better as expected. Where can you learn more? Go to http://abner-temi.info/. Enter I105 in the search box to learn more about \"Kidney Stone: Care Instructions. \"  Current as of: March 15, 2018  Content Version: 11.8  © 9271-7275 [employer]. Care instructions adapted under license by [employer] (which disclaims liability or warranty for this information).  If you have questions about a medical condition or this instruction, always ask your healthcare professional. [address] any warranty or liability for your use of this information.

## 2019-01-17 NOTE — ED PROVIDER NOTES
66 y.o. male with past medical history significant for HTN and hyperlipidemia who presents via private vehicle from home with chief complaint of abdominal pain. Patient arrives c/o \"sharp\", constant, non-radiating RLQ pain onset approx. 2 hours PTA tonight with associated nausea and 1 episode of vomiting. Patient endorses Hx of gallstones and kidney stones. Patient denies urinary symptoms. There are no other acute medical concerns at this time. Social hx: Former tobacco smoker (quit 1983); Denies EtOH use; Denies illicit drug use PCP: Angela Araya MD 
 
Note written by Gustavo Barbosa, as dictated by Harish Cerna MD 9:28 PM 
 
 
 
  
 
Past Medical History:  
Diagnosis Date  Brock esophagus  Hypertension, benign  Mixed hyperlipidemia  Sleep apnea Past Surgical History:  
Procedure Laterality Date  HX MOHS PROCEDURES    
 several times on the right Family History:  
Problem Relation Age of Onset  Heart Disease Brother   
     cabg age 59 Social History Socioeconomic History  Marital status: SINGLE Spouse name: Not on file  Number of children: Not on file  Years of education: Not on file  Highest education level: Not on file Social Needs  Financial resource strain: Not on file  Food insecurity - worry: Not on file  Food insecurity - inability: Not on file  Transportation needs - medical: Not on file  Transportation needs - non-medical: Not on file Occupational History  Not on file Tobacco Use  Smoking status: Former Smoker Last attempt to quit: 1983 Years since quittin.0  Smokeless tobacco: Never Used Substance and Sexual Activity  Alcohol use: No  
 Drug use: No  
 Sexual activity: Not on file Other Topics Concern  Not on file Social History Narrative  Not on file ALLERGIES: Ancef [cefazolin]; Minocycline;  Tetracycline; Crestor [rosuvastatin]; Erythromycin; and Lipitor [atorvastatin] Review of Systems Gastrointestinal: Positive for abdominal pain (sharp, RLQ), nausea and vomiting (x1). Genitourinary: Negative for difficulty urinating, dysuria and hematuria. All other systems reviewed and are negative. Vitals:  
 01/16/19 2120 BP: (!) 252/123 Pulse: 71 Resp: 18 Temp: 98.5 °F (36.9 °C) SpO2: 95% Weight: 133.8 kg (295 lb) Height: 5' 10\" (1.778 m) Physical Exam  
Constitutional: He appears well-developed and well-nourished. Overweight. Appears uncomfortable. HENT:  
Head: Normocephalic and atraumatic. Eyes: Conjunctivae are normal. No scleral icterus. Neck: No JVD present. No tracheal deviation present. Cardiovascular: Normal rate. Pulmonary/Chest: Effort normal.  
Abdominal: He exhibits no distension. Musculoskeletal: He exhibits no edema. Neurological: He is alert. Oriented Skin: No rash noted. No pallor. Psychiatric: He has a normal mood and affect. Nursing note and vitals reviewed. Note written by Gustavo Lindsay, as dictated by Clint Perez MD 9:28 PM  
 
MDM Procedures Progress Note: 
Results, treatment, and follow up plan have been discussed with patient. Questions were answered. Leticia Hernandez MD 
10:51 PM 
 
A/P: 1-2 mm right ureteral stone - pain-relief in ED with Toradol; reassuring appearance and exam; VSS; CBC, CMP, UA ok; CT shows the stone and some inflammatory stranding; home with Percocet, Zofran, Flomax; urology f/u as needed.   Leticia Hernandez MD 
10:53 PM

## 2019-07-22 DIAGNOSIS — E78.2 MIXED HYPERLIPIDEMIA: ICD-10-CM

## 2019-07-22 DIAGNOSIS — I10 HYPERTENSION, BENIGN: ICD-10-CM

## 2019-07-22 RX ORDER — VERAPAMIL HYDROCHLORIDE 240 MG/1
TABLET, FILM COATED, EXTENDED RELEASE ORAL
Qty: 90 TAB | Refills: 3 | Status: SHIPPED | OUTPATIENT
Start: 2019-07-22 | End: 2019-12-02 | Stop reason: SDUPTHER

## 2019-07-22 NOTE — TELEPHONE ENCOUNTER
Requested Prescriptions     Signed Prescriptions Disp Refills    verapamil ER (CALAN-SR) 240 mg CR tablet 90 Tab 3     Sig: TAKE 1 TABLET DAILY     Authorizing Provider: Shagufta Cook     Ordering User: Geraldo Parnell verbal orders

## 2019-07-24 ENCOUNTER — TELEPHONE (OUTPATIENT)
Dept: CARDIOLOGY CLINIC | Age: 64
End: 2019-07-24

## 2019-07-24 DIAGNOSIS — E78.2 MIXED HYPERLIPIDEMIA: ICD-10-CM

## 2019-07-24 DIAGNOSIS — I10 HYPERTENSION, BENIGN: ICD-10-CM

## 2019-07-24 NOTE — TELEPHONE ENCOUNTER
Called patient. Left message on safe voicemail stating his pharmacy contacted us stating verapamil was on backorder. I stated Dr. Divina Agarwal said we can switch him to something else or we can try sending verapamil rx to local pharmacy. Requested call back.

## 2019-07-24 NOTE — TELEPHONE ENCOUNTER
The pharmacy stated that verapamil 240mg is on back order and you will need to prescribe an alternative. Please advise.     Reference #: [id number]  Phone #: [phone]  Thanks

## 2019-12-02 DIAGNOSIS — I10 HYPERTENSION, BENIGN: ICD-10-CM

## 2019-12-02 DIAGNOSIS — E78.2 MIXED HYPERLIPIDEMIA: ICD-10-CM

## 2019-12-02 RX ORDER — VERAPAMIL HYDROCHLORIDE 240 MG/1
TABLET, FILM COATED, EXTENDED RELEASE ORAL
Qty: 90 TAB | Refills: 0 | Status: SHIPPED | OUTPATIENT
Start: 2019-12-02 | End: 2020-01-10 | Stop reason: SDUPTHER

## 2019-12-17 DIAGNOSIS — I10 HYPERTENSION, BENIGN: ICD-10-CM

## 2019-12-17 DIAGNOSIS — E78.2 MIXED HYPERLIPIDEMIA: ICD-10-CM

## 2019-12-17 RX ORDER — CLONIDINE HYDROCHLORIDE 0.2 MG/1
0.2 TABLET ORAL 2 TIMES DAILY
Qty: 180 TAB | Refills: 0 | Status: SHIPPED | OUTPATIENT
Start: 2019-12-17 | End: 2020-01-10 | Stop reason: SDUPTHER

## 2019-12-17 NOTE — TELEPHONE ENCOUNTER
Requested Prescriptions     Signed Prescriptions Disp Refills    cloNIDine HCl (CATAPRES) 0.2 mg tablet 180 Tab 0     Sig: Take 1 Tab by mouth two (2) times a day.      Authorizing Provider: Susu Shelley     Ordering User: Rom Alexis    Per Dr. Ele Weinberg verbal orders     Future Appointments   Date Time Provider Eugene Barger   1/10/2020 10:20 AM Zurdo Alvarez  [address]

## 2020-01-10 ENCOUNTER — OFFICE VISIT (OUTPATIENT)
Dept: CARDIOLOGY CLINIC | Age: 65
End: 2020-01-10

## 2020-01-10 VITALS
RESPIRATION RATE: 16 BRPM | HEART RATE: 58 BPM | DIASTOLIC BLOOD PRESSURE: 82 MMHG | WEIGHT: 315 LBS | SYSTOLIC BLOOD PRESSURE: 132 MMHG | BODY MASS INDEX: 45.1 KG/M2 | OXYGEN SATURATION: 98 % | HEIGHT: 70 IN

## 2020-01-10 DIAGNOSIS — I10 HYPERTENSION, BENIGN: Primary | ICD-10-CM

## 2020-01-10 DIAGNOSIS — E78.2 MIXED HYPERLIPIDEMIA: ICD-10-CM

## 2020-01-10 DIAGNOSIS — E66.01 OBESITY, MORBID (HCC): ICD-10-CM

## 2020-01-10 RX ORDER — CLONIDINE HYDROCHLORIDE 0.2 MG/1
0.2 TABLET ORAL 2 TIMES DAILY
Qty: 180 TAB | Refills: 3 | Status: SHIPPED | OUTPATIENT
Start: 2020-01-10 | End: 2020-04-21 | Stop reason: SDUPTHER

## 2020-01-10 RX ORDER — FOSINOPRIL SODIUM 40 MG/1
TABLET ORAL
Qty: 90 TAB | Refills: 3 | Status: SHIPPED | OUTPATIENT
Start: 2020-01-10 | End: 2020-04-21 | Stop reason: SDUPTHER

## 2020-01-10 RX ORDER — VERAPAMIL HYDROCHLORIDE 240 MG/1
TABLET, FILM COATED, EXTENDED RELEASE ORAL
Qty: 90 TAB | Refills: 3 | Status: SHIPPED | OUTPATIENT
Start: 2020-01-10 | End: 2020-04-21 | Stop reason: SDUPTHER

## 2020-01-10 NOTE — PROGRESS NOTES
HISTORY OF PRESENT ILLNESS  Oliva Gabriel is a 60 y.o. male     SUMMARY:   Problem List  Date Reviewed: 1/9/2020          Codes Class Noted    Obesity, morbid ([facility] [address] ICD-10-CM: E66.01  ICD-9-CM: 278.01  10/8/2018        Hypertension, benign ICD-10-CM: I10  ICD-9-CM: 401.1  Unknown    Overview Signed 2/23/2011  3:47 PM by Reagan Doe MD     Date of previous stress test was 9/25/06. The type of test was a treadmill - Standard Pedro Protocol with myocardial perfusion imaging. Exercise tolerance was good. Cardiac stress testing was negative for chest pain and myocardial ischemia. Stress echocardiogram images showed normal resting LV wall motion and no evidence of ischemia. Mixed hyperlipidemia ICD-10-CM: E78.2  ICD-9-CM: 272.2  Unknown              Current Outpatient Medications on File Prior to Visit   Medication Sig    cloNIDine HCl (CATAPRES) 0.2 mg tablet Take 1 Tab by mouth two (2) times a day.  verapamil ER (CALAN-SR) 240 mg CR tablet TAKE 1 TABLET DAILY    fosinopril (MONOPRIL) 40 mg tablet TAKE 1 TABLET DAILY    OMEPRAZOLE (PRILOSEC PO) Take 20 mg by mouth daily. OTC     No current facility-administered medications on file prior to visit. CARDIOLOGY STUDIES TO DATE:  9/25/06. The type of test was a treadmill - Standard Pedro Protocol with myocardial perfusion imaging. Exercise tolerance was good. Cardiac stress testing was negative for chest pain and myocardial ischemia. Stress echocardiogram images showed normal resting LV wall motion and no evidence of ischemia    Chief Complaint   Patient presents with    Hypertension     HPI :  Mr. Dank Campos is doing fine with no worrisome symptoms. Unfortunately, he has been so busy at work and he has gotten completely out of exercise and he has gained about 20 pounds since we last met.               CARDIAC ROS:   negative for chest pain, dyspnea, palpitations, syncope, orthopnea, paroxysmal nocturnal dyspnea, exertional chest pressure/discomfort, claudication, lower extremity edema    Family History   Problem Relation Age of Onset    Heart Disease Brother         cabg age 59       Past Medical History:   Diagnosis Date    GERD (gastroesophageal reflux disease)     Hypertension, benign     Mixed hyperlipidemia     Sleep apnea        GENERAL ROS:  A comprehensive review of systems was negative except for that written in the HPI. Visit Vitals  /82 (BP 1 Location: Left arm, BP Patient Position: Sitting)   Pulse (!) 58   Resp 16   Ht 5' 10\" (1.778 m)   Wt 315 lb 6.4 oz (143.1 kg)   SpO2 98%   BMI 45.26 kg/m²       Wt Readings from Last 3 Encounters:   01/10/20 315 lb 6.4 oz (143.1 kg)   01/16/19 295 lb (133.8 kg)   10/08/18 294 lb (133.4 kg)            BP Readings from Last 3 Encounters:   01/10/20 132/82   01/16/19 181/84   10/08/18 110/70       PHYSICAL EXAM  General appearance: alert, cooperative, no distress, appears stated age  Neurologic: Alert and oriented X 3  Neck: supple, symmetrical, trachea midline, no adenopathy, no carotid bruit and no JVD  Lungs: clear to auscultation bilaterally  Heart: regular rate and rhythm, S1, S2 normal, no murmur, click, rub or gallop  Extremities: extremities normal, atraumatic, no cyanosis or edema    Lab Results   Component Value Date/Time    Cholesterol, total 208 (H) 09/06/2013 10:49 AM    Cholesterol, total 191 05/13/2013 01:17 PM    HDL Cholesterol 38 (L) 09/06/2013 10:49 AM    HDL Cholesterol 40 05/13/2013 01:17 PM    LDL, calculated 140 (H) 09/06/2013 10:49 AM    LDL, calculated 133 (H) 05/13/2013 01:17 PM    Triglyceride 151 (H) 09/06/2013 10:49 AM    Triglyceride 91 05/13/2013 01:17 PM     ASSESSMENT :      Mr. Brenda Bush is stable and asymptomatic and well compensated from a cardiac standpoint on a reasonable medical regimen and needs no cardiac testing at this time.          current treatment plan is effective, no change in therapy  lab results and schedule of future lab studies reviewed with patient  reviewed diet, exercise and weight control    Encounter Diagnoses   Name Primary?  Hypertension, benign Yes    Mixed hyperlipidemia     Obesity, morbid ([facility] [address]      Orders Placed This Encounter    AMB POC EKG ROUTINE W/ 12 LEADS, INTER & REP       Follow-up and Dispositions    · Return in about 1 year (around 1/10/2021).          Lenetta Cushing, MD  1/10/2020

## 2020-04-21 DIAGNOSIS — I10 HYPERTENSION, BENIGN: ICD-10-CM

## 2020-04-21 DIAGNOSIS — E78.2 MIXED HYPERLIPIDEMIA: ICD-10-CM

## 2020-04-21 RX ORDER — VERAPAMIL HYDROCHLORIDE 240 MG/1
TABLET, FILM COATED, EXTENDED RELEASE ORAL
Qty: 90 TAB | Refills: 3 | Status: SHIPPED | OUTPATIENT
Start: 2020-04-21 | End: 2021-04-05

## 2020-04-21 RX ORDER — FOSINOPRIL SODIUM 40 MG/1
TABLET ORAL
Qty: 90 TAB | Refills: 3 | Status: SHIPPED | OUTPATIENT
Start: 2020-04-21 | End: 2021-04-05

## 2020-04-21 RX ORDER — CLONIDINE HYDROCHLORIDE 0.2 MG/1
0.2 TABLET ORAL 2 TIMES DAILY
Qty: 180 TAB | Refills: 3 | Status: SHIPPED | OUTPATIENT
Start: 2020-04-21 | End: 2021-05-17

## 2020-04-21 NOTE — TELEPHONE ENCOUNTER
Requested Prescriptions     Signed Prescriptions Disp Refills    verapamil ER (CALAN-SR) 240 mg CR tablet 90 Tab 3     Sig: TAKE 1 TABLET DAILY     Authorizing Provider: Ramirez Rosa     Ordering User: Siomara Santana    cloNIDine HCL (CATAPRES) 0.2 mg tablet 180 Tab 3     Sig: Take 1 Tab by mouth two (2) times a day.      Authorizing Provider: Ramirez Rosa     Ordering User: Siomara Santana    fosinopriL (MONOPRIL) 40 mg tablet 90 Tab 3     Sig: TAKE 1 TABLET DAILY     Authorizing Provider: Ramirez Rosa     Ordering User: Siomara Dinero verbal orders

## 2021-04-05 DIAGNOSIS — E78.2 MIXED HYPERLIPIDEMIA: ICD-10-CM

## 2021-04-05 DIAGNOSIS — I10 HYPERTENSION, BENIGN: ICD-10-CM

## 2021-04-05 RX ORDER — VERAPAMIL HYDROCHLORIDE 240 MG/1
TABLET, FILM COATED, EXTENDED RELEASE ORAL
Qty: 90 TAB | Refills: 0 | Status: SHIPPED | OUTPATIENT
Start: 2021-04-05 | End: 2021-07-06

## 2021-04-05 RX ORDER — FOSINOPRIL SODIUM 40 MG/1
TABLET ORAL
Qty: 90 TAB | Refills: 0 | Status: SHIPPED | OUTPATIENT
Start: 2021-04-05 | End: 2021-07-06

## 2021-04-05 NOTE — TELEPHONE ENCOUNTER
Requested Prescriptions     Signed Prescriptions Disp Refills    verapamil ER (CALAN-SR) 240 mg CR tablet 90 Tab 0     Sig: TAKE 1 TABLET DAILY. *NEED TO SCHEDULE VIRTUAL OR OFFICE VISIT FOLLOW UP FOR FURTHER REFILLS*     Authorizing Provider: Katie Tang User: Phoebe Estrada    fosinopriL (MONOPRIL) 40 mg tablet 90 Tab 0     Sig: TAKE 1 TABLET DAILY.  *NEED TO SCHEDULE VIRTUAL OR OFFICE VISIT FOLLOW UP FOR FURTHER REFILLS*     Authorizing Provider: Katie Valdez     Ordering User: Phoebe Estrada    Per Dr. Ahsan Kennedy verbal orders

## 2021-05-15 DIAGNOSIS — E78.2 MIXED HYPERLIPIDEMIA: ICD-10-CM

## 2021-05-15 DIAGNOSIS — I10 HYPERTENSION, BENIGN: ICD-10-CM

## 2021-05-17 RX ORDER — CLONIDINE HYDROCHLORIDE 0.2 MG/1
0.2 TABLET ORAL 2 TIMES DAILY
Qty: 180 TAB | Refills: 0 | Status: SHIPPED | OUTPATIENT
Start: 2021-05-17 | End: 2021-08-13

## 2021-05-17 NOTE — TELEPHONE ENCOUNTER
Requested Prescriptions     Signed Prescriptions Disp Refills    cloNIDine HCL (CATAPRES) 0.2 mg tablet 180 Tab 0     Sig: Take 1 Tab by mouth two (2) times a day.  *NEED TO SCHEDULE VIRTUAL OR OFFICE VISIT FOLLOW UP FOR FURTHER REFILLS*     Authorizing Provider: Vanessa Ozuna     Ordering User: Natividad Bates    Per Dr. Андрей Stein verbal orders

## 2021-07-04 DIAGNOSIS — I10 HYPERTENSION, BENIGN: ICD-10-CM

## 2021-07-04 DIAGNOSIS — E78.2 MIXED HYPERLIPIDEMIA: ICD-10-CM

## 2021-07-06 RX ORDER — FOSINOPRIL SODIUM 40 MG/1
TABLET ORAL
Qty: 3 TABLET | Refills: 0 | Status: SHIPPED | OUTPATIENT
Start: 2021-07-06

## 2021-07-06 RX ORDER — VERAPAMIL HYDROCHLORIDE 240 MG/1
TABLET, FILM COATED, EXTENDED RELEASE ORAL
Qty: 3 TABLET | Refills: 0 | Status: SHIPPED | OUTPATIENT
Start: 2021-07-06

## 2021-07-06 NOTE — TELEPHONE ENCOUNTER
Requested Prescriptions     Signed Prescriptions Disp Refills    fosinopriL (MONOPRIL) 40 mg tablet 3 Tablet 0     Sig: TAKE 1 TABLET DAILY. *NEED TO SCHEDULE VIRTUAL OR OFFICE VISIT FOLLOW UP FOR FURTHER REFILLS*     Authorizing Provider: Ashlie Marion     Ordering User: Aaron MELGAR    verapamil ER (CALAN-SR) 240 mg CR tablet 3 Tablet 0     Sig: TAKE 1 TABLET DAILY.  *NEED TO SCHEDULE VIRTUAL OR OFFICE VISIT FOLLOW UP FOR FURTHER REFILLS*     Authorizing Provider: Ashlie Marion     Ordering User: Connie Maki    Per Dr. Justin George verbal orders

## 2021-08-13 DIAGNOSIS — E78.2 MIXED HYPERLIPIDEMIA: ICD-10-CM

## 2021-08-13 DIAGNOSIS — I10 HYPERTENSION, BENIGN: ICD-10-CM

## 2021-08-13 RX ORDER — CLONIDINE HYDROCHLORIDE 0.2 MG/1
0.2 TABLET ORAL 2 TIMES DAILY
Qty: 30 TABLET | Refills: 0 | Status: SHIPPED | OUTPATIENT
Start: 2021-08-13

## 2021-08-13 NOTE — TELEPHONE ENCOUNTER
Requested Prescriptions     Signed Prescriptions Disp Refills    cloNIDine HCL (CATAPRES) 0.2 mg tablet 30 Tablet 0     Sig: Take 1 Tablet by mouth two (2) times a day.  *NEED TO SCHEDULE VIRTUAL OR OFFICE VISIT FOLLOW UP FOR FURTHER REFILLS*     Authorizing Provider: Kalani Richardson     Ordering User: Jemma Antoine    Per Dr. Lamont Varela verbal orders

## 2022-03-19 PROBLEM — E66.01 OBESITY, MORBID (HCC): Status: ACTIVE | Noted: 2018-10-08

## 2025-08-25 ENCOUNTER — TRANSCRIBE ORDERS (OUTPATIENT)
Dept: SCHEDULING | Age: 70
End: 2025-08-25

## 2025-08-25 DIAGNOSIS — M25.512 LEFT SHOULDER PAIN, UNSPECIFIED CHRONICITY: Primary | ICD-10-CM

## 2025-08-25 DIAGNOSIS — M25.812 ENLARGEMENT OF STERNOCLAVICULAR JOINT, LEFT: ICD-10-CM

## 2025-08-28 ENCOUNTER — HOSPITAL ENCOUNTER (OUTPATIENT)
Facility: HOSPITAL | Age: 70
Discharge: HOME | End: 2025-08-31
Attending: ORTHOPAEDIC SURGERY
Payer: MEDICARE

## 2025-08-28 DIAGNOSIS — M25.812 ENLARGEMENT OF STERNOCLAVICULAR JOINT, LEFT: ICD-10-CM

## 2025-08-28 DIAGNOSIS — M25.512 LEFT SHOULDER PAIN, UNSPECIFIED CHRONICITY: ICD-10-CM

## 2025-08-28 PROCEDURE — 73221 MRI JOINT UPR EXTREM W/O DYE: CPT
